# Patient Record
Sex: MALE | Race: BLACK OR AFRICAN AMERICAN | NOT HISPANIC OR LATINO
[De-identification: names, ages, dates, MRNs, and addresses within clinical notes are randomized per-mention and may not be internally consistent; named-entity substitution may affect disease eponyms.]

---

## 2021-11-12 PROBLEM — Z00.00 ENCOUNTER FOR PREVENTIVE HEALTH EXAMINATION: Status: ACTIVE | Noted: 2021-11-12

## 2021-11-24 ENCOUNTER — APPOINTMENT (OUTPATIENT)
Dept: UROLOGY | Facility: CLINIC | Age: 61
End: 2021-11-24

## 2021-11-24 DIAGNOSIS — Z72.89 OTHER PROBLEMS RELATED TO LIFESTYLE: ICD-10-CM

## 2021-11-24 DIAGNOSIS — Z78.9 OTHER SPECIFIED HEALTH STATUS: ICD-10-CM

## 2021-12-01 ENCOUNTER — APPOINTMENT (OUTPATIENT)
Dept: UROLOGY | Facility: CLINIC | Age: 61
End: 2021-12-01
Payer: COMMERCIAL

## 2021-12-01 VITALS
HEART RATE: 78 BPM | HEIGHT: 73 IN | DIASTOLIC BLOOD PRESSURE: 90 MMHG | OXYGEN SATURATION: 99 % | WEIGHT: 168 LBS | TEMPERATURE: 97.8 F | BODY MASS INDEX: 22.26 KG/M2 | SYSTOLIC BLOOD PRESSURE: 128 MMHG

## 2021-12-01 DIAGNOSIS — Z80.42 FAMILY HISTORY OF MALIGNANT NEOPLASM OF PROSTATE: ICD-10-CM

## 2021-12-01 PROCEDURE — 99215 OFFICE O/P EST HI 40 MIN: CPT

## 2021-12-01 RX ORDER — TADALAFIL 20 MG/1
20 TABLET ORAL
Qty: 30 | Refills: 11 | Status: ACTIVE | COMMUNITY
Start: 2021-12-01 | End: 1900-01-01

## 2021-12-01 NOTE — LETTER HEADER
[FreeTextEntry3] : Sarbjit Jacobs MD, PhD\par [address], [address]\[place] [address]

## 2021-12-01 NOTE — LETTER BODY
[Dear  ___] : Dear  [unfilled], [Consult Letter:] : I had the pleasure of evaluating your patient, [unfilled]. [Please see my note below.] : Please see my note below. [Consult Closing:] : Thank you very much for allowing me to participate in the care of this patient.  If you have any questions, please do not hesitate to contact me. [Sincerely,] : Sincerely, [FreeTextEntry2] : Dr. Satish Herron\par [address] [place] [address], [facility] [place] [id number] [FreeTextEntry1] : 12/01/2021: Mr. Gordon is a 62y/o M presents today for evaluation of ED. Concerned his testosterone is low. Sexual desire and vitality are strong. Urination is good. Wakes 1x at night to urinate. Notes that from the start of the pandemic, has been unable to sleep through the night. Exercises regularly. Has two children. \par \par I prescribed the patient Tadalafil 20mg PRN 2-3 hours before sexual activity for ED. I informed the patient that they may experience tingling of the skin, headache, warm flushed feeling, heartburn, backache, and on rare occasion, visual or hearing disturbances. Informed the patient how to use [employer] to  Tadalafil at a [employer] or [employer] at a discounted price. \par \par Blood work today includes BMP, alkaline phosphatase, androstenedione, dehydroepiandrosterone, dehydroepiandrosterone-sulfate, dihydrotestosterone, estradiol, estrogen, FSH, LH, progesterone, prolactin, SHBG, PSA, and testosterone. \par The patient produced a urine sample which will be sent for urinalysis, urine cytology, and urine culture. \par \par Patient will schedule a telehealth visit in 3 weeks for reassessment of Tadalafil 20mg. We will try Amwell, but if there is difficulty, will try Doximity.  [FreeTextEntry3] : Sarbjit Jacobs MD, PhD

## 2021-12-01 NOTE — ADDENDUM
[FreeTextEntry1] : I, Angela Brooksin, acted solely as a scribe for Dr. Sarbjit Jacobs on this date 12/01/2021.\par \par All medical record entries made by the Scribe were at my, Dr. Sarbjit Jacobs, direction and personally dictated by me on 12/01/2021. I have reviewed the chart and agree that the record accurately reflects my personal performance of the history, physical exam, assessment and plan.  I have also personally directed, reviewed and agreed with the chart.  Dr. Abel

## 2021-12-01 NOTE — ASSESSMENT
[FreeTextEntry1] : 12/01/2021: Mr. Gordon is a 60y/o M presents today for evaluation of ED. Concerned his testosterone is low. Sexual desire and vitality are strong. Urination is good. Wakes 1x at night to urinate. Notes that from the start of the pandemic, has been unable to sleep through the night. Exercises regularly. Has two children. \par \par I prescribed the patient Tadalafil 20mg PRN 2-3 hours before sexual activity for ED. I informed the patient that they may experience tingling of the skin, headache, warm flushed feeling, heartburn, backache, and on rare occasion, visual or hearing disturbances. Informed the patient how to use [employer] to  Tadalafil at a [employer] or [employer] at a discounted price. \par \par Blood work today includes BMP, alkaline phosphatase, androstenedione, dehydroepiandrosterone, dehydroepiandrosterone-sulfate, dihydrotestosterone, estradiol, estrogen, FSH, LH, progesterone, prolactin, SHBG, PSA, and testosterone. \par The patient produced a urine sample which will be sent for urinalysis, urine cytology, and urine culture. \par \par Patient will schedule a telehealth visit in 3 weeks for reassessment of Tadalafil 20mg. We will try Amwell, but if there is difficulty, will try Doximity. \par \par Preparation, in-person office visit, and coordination of care took: 40 minutes

## 2021-12-01 NOTE — ADDENDUM
[FreeTextEntry1] : I, Angela Brooksin, acted solely as a scribe for Dr. Sarbjit Jacobs on this date 12/01/2021.\par \par All medical record entries made by the Scribe were at my, Dr. Sarbjit Jacobs, direction and personally dictated by me on 12/01/2021. I have reviewed the chart and agree that the record accurately reflects my personal performance of the history, physical exam, assessment and plan.  I have also personally directed, reviewed and agreed with the chart.

## 2021-12-01 NOTE — HISTORY OF PRESENT ILLNESS
[FreeTextEntry1] : 12/01/2021: Mr. Gordon is a 60y/o M presents today for evaluation of ED. Concerned his testosterone is low. Sexual desire and vitality are strong. Urination is good. Wakes 1x at night to urinate. Notes that from the start of the pandemic, has been unable to sleep through the night. Exercises regularly. Has two children.

## 2021-12-16 LAB
ALP BLD-CCNC: 57 U/L
ANDROST SERPL-MCNC: 60 NG/DL
ANDROSTANOLONE SERPL-MCNC: 16 NG/DL
ANION GAP SERPL CALC-SCNC: 13 MMOL/L
BUN SERPL-MCNC: 13 MG/DL
CALCIUM SERPL-MCNC: 9.8 MG/DL
CHLORIDE SERPL-SCNC: 101 MMOL/L
CO2 SERPL-SCNC: 26 MMOL/L
CREAT SERPL-MCNC: 1.02 MG/DL
DHEA SERPL-MCNC: 148 NG/DL
DHEA-S SERPL-MCNC: 159 UG/DL
ESTRADIOL SERPL-MCNC: 30 PG/ML
ESTROGEN SERPL-MCNC: 109 PG/ML
FSH SERPL-MCNC: 1.9 IU/L
GLUCOSE SERPL-MCNC: 120 MG/DL
POTASSIUM SERPL-SCNC: 4.3 MMOL/L
PROGEST SERPL-MCNC: 0.2 NG/ML
PROLACTIN SERPL-MCNC: 8.4 NG/ML
PSA SERPL-MCNC: 1 NG/ML
SODIUM SERPL-SCNC: 140 MMOL/L
TESTOST BND SERPL-MCNC: 8.5 PG/ML
TESTOSTERONE TOTAL S: 279 NG/DL

## 2021-12-29 ENCOUNTER — APPOINTMENT (OUTPATIENT)
Dept: UROLOGY | Facility: CLINIC | Age: 61
End: 2021-12-29

## 2021-12-29 DIAGNOSIS — R68.82 DECREASED LIBIDO: ICD-10-CM

## 2021-12-29 DIAGNOSIS — N52.9 MALE ERECTILE DYSFUNCTION, UNSPECIFIED: ICD-10-CM

## 2021-12-31 PROBLEM — R68.82 LOW LIBIDO: Status: ACTIVE | Noted: 2021-12-31

## 2021-12-31 PROBLEM — N52.9 MALE ERECTILE DYSFUNCTION: Status: ACTIVE | Noted: 2021-11-24

## 2021-12-31 NOTE — HISTORY OF PRESENT ILLNESS
[Other Location: e.g. School (Enter Location, City,State)___] : at [unfilled], at the time of the visit. [FreeTextEntry1] : 12/01/2021: Mr. Dean is a 60y/o M presents today for evaluation of ED. Concerned his testosterone is low. Sexual desire and vitality are strong. Urination is good. Wakes 1x at night to urinate. Notes that from the start of the pandemic, has been unable to sleep through the night. Exercises regularly. Has two children. \par \par 12/29/2021: I attempted to call the patient at 5:44 PM. Patient was scheduled for 5:30 PM appointment. I left a voicemail for the patient to reschedule the telehealth appointment to discuss how tadalafil 20mg prn is helping him.

## 2021-12-31 NOTE — ASSESSMENT
[FreeTextEntry1] : 12/01/2021: Mr. Dean is a 60y/o M presents today for evaluation of ED. Concerned his testosterone is low. Sexual desire and vitality are strong. Urination is good. Wakes 1x at night to urinate. Notes that from the start of the pandemic, has been unable to sleep through the night. Exercises regularly. Has two children. \par \par 12/29/2021: I attempted to call the patient at 5:44 PM. Patient was scheduled for 5:30 PM appointment. I left a voicemail for the patient to reschedule the telehealth appointment to discuss how tadalafil 20mg prn is helping him. \par \par

## 2024-01-01 NOTE — ADDENDUM
[FreeTextEntry1] : I, Angela Brooksin, acted solely as a scribe for Dr. Sarbjit Jacobs on this date 12/29/2021.\par \par All medical record entries made by the Scribe were at my, Dr. Sarbjit Jacobs, direction and personally dictated by me on 12/29/2021. I have reviewed the chart and agree that the record accurately reflects my personal performance of the history, physical exam, assessment and plan.  I have also personally directed, reviewed and agreed with the chart.  Current every day smoker

## 2024-12-25 PROBLEM — F10.90 ALCOHOL USE: Status: ACTIVE | Noted: 2021-11-24

## 2025-04-16 ENCOUNTER — APPOINTMENT (OUTPATIENT)
Dept: CT IMAGING | Facility: IMAGING CENTER | Age: 65
End: 2025-04-16
Payer: COMMERCIAL

## 2025-04-16 ENCOUNTER — OUTPATIENT (OUTPATIENT)
Dept: OUTPATIENT SERVICES | Facility: HOSPITAL | Age: 65
LOS: 1 days | End: 2025-04-16
Payer: COMMERCIAL

## 2025-04-16 DIAGNOSIS — E78.5 HYPERLIPIDEMIA, UNSPECIFIED: ICD-10-CM

## 2025-04-16 PROCEDURE — 75571 CT HRT W/O DYE W/CA TEST: CPT | Mod: 26

## 2025-04-16 PROCEDURE — 75571 CT HRT W/O DYE W/CA TEST: CPT
